# Patient Record
Sex: FEMALE | Race: WHITE | NOT HISPANIC OR LATINO | Employment: FULL TIME | URBAN - METROPOLITAN AREA
[De-identification: names, ages, dates, MRNs, and addresses within clinical notes are randomized per-mention and may not be internally consistent; named-entity substitution may affect disease eponyms.]

---

## 2017-05-03 ENCOUNTER — TRANSCRIBE ORDERS (OUTPATIENT)
Dept: OBSTETRICS AND GYNECOLOGY | Facility: CLINIC | Age: 57
End: 2017-05-03

## 2017-05-03 DIAGNOSIS — Z12.31 VISIT FOR SCREENING MAMMOGRAM: Primary | ICD-10-CM

## 2017-05-05 ENCOUNTER — HOSPITAL ENCOUNTER (OUTPATIENT)
Dept: MAMMOGRAPHY | Facility: HOSPITAL | Age: 57
Discharge: HOME | End: 2017-05-05
Attending: OBSTETRICS & GYNECOLOGY | Admitting: OBSTETRICS & GYNECOLOGY

## 2017-05-05 DIAGNOSIS — Z12.31 VISIT FOR SCREENING MAMMOGRAM: ICD-10-CM

## 2017-05-05 PROCEDURE — 77063 BREAST TOMOSYNTHESIS BI: CPT | Performed by: RADIOLOGY

## 2017-05-05 PROCEDURE — 77067 SCR MAMMO BI INCL CAD: CPT | Performed by: RADIOLOGY

## 2017-05-05 PROCEDURE — G0202 SCR MAMMO BI INCL CAD: HCPCS

## 2017-05-05 PROCEDURE — 77063 BREAST TOMOSYNTHESIS BI: CPT

## 2017-05-08 PROBLEM — Z01.419 WELL WOMAN EXAM: Status: ACTIVE | Noted: 2017-05-08

## 2017-06-20 ENCOUNTER — OFFICE VISIT (OUTPATIENT)
Dept: OBSTETRICS AND GYNECOLOGY | Facility: CLINIC | Age: 57
End: 2017-06-20

## 2017-06-20 ENCOUNTER — APPOINTMENT (OUTPATIENT)
Dept: LAB | Facility: HOSPITAL | Age: 57
End: 2017-06-20

## 2017-06-20 VITALS
HEIGHT: 62 IN | WEIGHT: 178 LBS | DIASTOLIC BLOOD PRESSURE: 82 MMHG | RESPIRATION RATE: 14 BRPM | BODY MASS INDEX: 32.76 KG/M2 | SYSTOLIC BLOOD PRESSURE: 120 MMHG

## 2017-06-20 DIAGNOSIS — Z01.411 ENCOUNTER FOR GYNECOLOGICAL EXAMINATION WITH ABNORMAL FINDING: Primary | ICD-10-CM

## 2017-06-20 DIAGNOSIS — N39.46 MIXED URGE AND STRESS INCONTINENCE: ICD-10-CM

## 2017-06-20 LAB
BACTERIA UR QL AUTO: ABNORMAL /HPF
BILIRUB UR QL STRIP: NEGATIVE
CLARITY UR: CLEAR
COLOR UR: YELLOW
GLUCOSE UR STRIP-MCNC: NEGATIVE MG/DL
HBA1C MFR BLD: 5.8 % (ref 4.8–5.6)
HGB UR QL STRIP.AUTO: ABNORMAL
HYALINE CASTS UR QL AUTO: ABNORMAL /LPF
KETONES UR QL STRIP: NEGATIVE
LEUKOCYTE ESTERASE UR QL STRIP.AUTO: ABNORMAL
NITRITE UR QL STRIP: NEGATIVE
PH UR STRIP.AUTO: 5.5 [PH] (ref 5–8)
PROT UR QL STRIP: NEGATIVE
RBC # UR: ABNORMAL /HPF
REF LAB TEST METHOD: ABNORMAL
SP GR UR STRIP: 1.01 (ref 1–1.03)
SQUAMOUS #/AREA URNS HPF: ABNORMAL /HPF
UROBILINOGEN UR QL STRIP: ABNORMAL
WBC UR QL AUTO: ABNORMAL /HPF

## 2017-06-20 PROCEDURE — 87147 CULTURE TYPE IMMUNOLOGIC: CPT | Performed by: OBSTETRICS & GYNECOLOGY

## 2017-06-20 PROCEDURE — 81001 URINALYSIS AUTO W/SCOPE: CPT | Performed by: OBSTETRICS & GYNECOLOGY

## 2017-06-20 PROCEDURE — 83036 HEMOGLOBIN GLYCOSYLATED A1C: CPT | Performed by: OBSTETRICS & GYNECOLOGY

## 2017-06-20 PROCEDURE — 36415 COLL VENOUS BLD VENIPUNCTURE: CPT | Performed by: OBSTETRICS & GYNECOLOGY

## 2017-06-20 PROCEDURE — 87086 URINE CULTURE/COLONY COUNT: CPT | Performed by: OBSTETRICS & GYNECOLOGY

## 2017-06-20 PROCEDURE — 99396 PREV VISIT EST AGE 40-64: CPT | Performed by: OBSTETRICS & GYNECOLOGY

## 2017-06-20 NOTE — PROGRESS NOTES
Subjective   Chief Complaint   Patient presents with   • Gynecologic Exam     Milana Elias is a 56 y.o. year old  menopausal female presenting to be seen for her annual exam.  This past year she has not been on hormone replacement therapy.  There has not been vaginal bleeding in the last 12 months.  Menopausal symptoms are present (night sweats and urinary frequency) but not affecting her quality of life .    Youngest daughter, Madhuri is getting  in 2017 in [place].    SEXUAL Hx:  She is currently sexually active.  In the past year there has not been new sexual partners.    Condoms are not typically used.  She would not like to be screened for STD's at today's exam.  [place] is painful: occassionally  HEALTH Hx:  She exercises regularly: no (but is planning to start exercising more ).  She wears her seat belt: yes.  She has concerns about domestic violence: no.  She has noticed changes in height: no.  OTHER THINGS SHE WANTS TO DISCUSS TODAY:  Nothing else    The following portions of the patient's history were reviewed and updated as appropriate:problem list, current medications, allergies, past family history, past medical history, past social history and past surgical history.    Smoking status: Never Smoker                                                              Smokeless status: Never Used                          Review of Systems  Constitutional POS: nothing reported    NEG: anorexia or night sweats   Gastointestinal POS: bloating - normal U/S 2015    NEG: change in bowel habits, melena or reflux symptoms   Genitourinary POS: frequency, hesitancy, nocturia and urgency - affecting her activities of daily living.    NEG: dysuria or hematuria   Integument POS: nothing reported    NEG: moles that are changing in size, shape, color or rashes   Breast POS: nothing reported    NEG: persistent breast lump, skin dimpling or nipple discharge        Objective   /82  Resp 14   "Ht 62\" (157.5 cm)  Wt 178 lb (80.7 kg)  LMP  (LMP Unknown)  Breastfeeding? No  BMI 32.56 kg/m2    General:  well developed; well nourished  no acute distress   Skin:  No suspicious lesions seen   Thyroid: normal to inspection and palpation   Breasts:  Examined in supine position  Symmetric without masses or skin dimpling  Nipples normal without inversion, lesions or discharge  There are no palpable axillary nodes   Abdomen: soft, non-tender; no masses  no umbilical or inginual hernias are present  no hepato-splenomegaly   Pelvis: Clinical staff was present for exam  External genitalia:  normal appearance of the external genitalia including Bartholin's and [place]'s glands.  :  urethral meatus normal; urethral hypermobility is absent.  Vaginal:  atrophic mucosal changes are present;  Cervix:  normal appearance.  Uterus:  normal size, shape and consistency.  Adnexa:  non palpable bilaterally.  Rectal:  digital rectal exam not performed; anus visually normal appearing.        Assessment   1. Normal GYN exam  2. Menopausal female currently not on HRT - without significant symptoms affecting activities of daily living  3. ELAINE - affecting her activities of daily living.     Plan   1. Pap was not done today.  I explained to Milana that the recommendations for Pap smear interval in a low risk patient has lengthened to 3 years time.  I told Milana she still needs to be seen in our office yearly for a full physical including breast and pelvic exam.  2. She was encouraged to get yearly mammograms.  She should report any palpable breast lump(s) or skin changes regardless of mammographic findings.  I explained to Milana that notification regarding her mammogram results will come from the center performing the study.  Our office will not be routinely calling with mammogram results.  It is her responsibility to make sure that the results from the mammogram are communicated to her by the breast center.  If she has any questions about " the results, she is welcome to call our office anytime.  3. Up to date on colon cancer screening  4. The following tests were ordered today: HgBA1c and UA with culture if indicated.  It was explained to Milana that all lab test should be back within the one week after they are performed. She will be notified about the results, regardless of the findings. If she has not been contacted by the office within 2 weeks after the test has been performed, it is her responsibility to contact us to learn about her results.  5. Follow up w/u of her bladder concerns w/ urolog            This note was electronically signed.    Reid Raphael M.D.  June 20, 2017

## 2017-06-22 ENCOUNTER — TELEPHONE (OUTPATIENT)
Dept: OBSTETRICS AND GYNECOLOGY | Facility: CLINIC | Age: 57
End: 2017-06-22

## 2017-06-22 LAB
BACTERIA SPEC AEROBE CULT: ABNORMAL
STREP GROUPING: ABNORMAL

## 2017-06-22 NOTE — TELEPHONE ENCOUNTER
----- Message from Peggy Degroot sent at 6/22/2017  3:42 PM EDT -----  Patient is returning your call.

## 2017-09-14 ENCOUNTER — OFFICE VISIT (OUTPATIENT)
Dept: GASTROENTEROLOGY | Facility: CLINIC | Age: 57
End: 2017-09-14

## 2017-09-14 VITALS
SYSTOLIC BLOOD PRESSURE: 110 MMHG | HEART RATE: 70 BPM | TEMPERATURE: 98 F | HEIGHT: 62 IN | DIASTOLIC BLOOD PRESSURE: 80 MMHG | OXYGEN SATURATION: 98 % | RESPIRATION RATE: 14 BRPM | BODY MASS INDEX: 31.65 KG/M2 | WEIGHT: 172 LBS

## 2017-09-14 DIAGNOSIS — R14.0 ABDOMINAL BLOATING: ICD-10-CM

## 2017-09-14 DIAGNOSIS — K58.9 IRRITABLE BOWEL SYNDROME, UNSPECIFIED TYPE: Primary | ICD-10-CM

## 2017-09-14 PROCEDURE — 99214 OFFICE O/P EST MOD 30 MIN: CPT | Performed by: INTERNAL MEDICINE

## 2017-09-14 NOTE — PROGRESS NOTES
[facility]   History & Physical    Chief Complaint   Patient presents with   • Follow-up   • Crohn's Disease         Subjective Here to discuss whether colonoscopy is needed.        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 Diagnosis was made by colonoscopy and biopsies in 2015. Onset was unknown time ago. Disease has involved colon. The patient has had no surgery for treatment of their IBD. The patient is currently having bm's every 3 days and is using Miralax  which are formed and without blood or mucous.  The patient admits to abdominal pain, located in the RLQ. The pain is described as pressure-like, and is 5/10 in intensity.  The patient does not have fever.  The patient does not have weight loss.  The patient does not have extraintestinal symptoms  Although she does have swelling which she thinks may be due to a hernia.  Last colonoscopy was April 2015. She has been off Lialda since Sep 2016.  She has noted more bloating since stopping Lialda.  She is having bm's every 2 days.Her RLQ pain is no longer occurring.  She has toyed with the idea of using a probiotic.             Past Medical History:   Diagnosis Date   • Hyperlipidemia 2012   • IBS (irritable bowel syndrome)    • Subclinical hypothyroidism 2012    Resolved   • Vitamin D deficiency          Family History   Problem Relation Age of Onset   • Brain cancer Mother    • Stroke Mother    • Hypertension Father    • Diabetes Father    • Muscular dystrophy Cousin    • Breast cancer Neg Hx    • Ovarian cancer Neg Hx           reports that she has never smoked. She has never used smokeless tobacco. She reports that she does not drink alcohol or use illicit drugs.        Current Outpatient Prescriptions:   •  Calcium Carbonate (CALTRATE 600 PO), Take  by mouth., Disp: , Rfl:   •  Cholecalciferol (VITAMIN D PO), Take  by mouth., Disp: , Rfl:   •  pravastatin (PRAVACHOL) 20 MG  "tablet, Take 20 mg by mouth daily., Disp: , Rfl:     Allergies:  Levaquin [levofloxacin]    ROS:    Review of Systems   Constitution: Negative.   HENT: Negative.    Eyes: Negative.    Cardiovascular: Negative.    Respiratory: Negative.    Endocrine: Negative.    Hematologic/Lymphatic: Negative.    Skin: Negative.    Musculoskeletal: Negative.    Gastrointestinal: Negative.    Genitourinary: Negative.    Neurological: Negative.    Psychiatric/Behavioral: Negative.    Allergic/Immunologic: Negative.        Objective     Blood pressure 110/80, pulse 70, temperature 98 °F (36.7 °C), temperature source Temporal Artery , resp. rate 14, height 62\" (157.5 cm), weight 172 lb (78 kg), SpO2 98 %, not currently breastfeeding.    Physical Exam   Constitutional: Pt is oriented to person, place, and time and well-developed, well-nourished, and in no distress.   HENT:   Mouth/Throat: Oropharynx is clear and moist.   Neck: Normal range of motion. Neck supple.   Cardiovascular: Normal rate, regular rhythm and normal heart sounds.    Pulmonary/Chest: Effort normal and breath sounds normal. No respiratory distress. No  wheezes.   Abdominal: Soft. Bowel sounds are normal.  Soft, non-tender, normal bowel sounds; no bruits, organomegaly or masses.She does have some distention of abdomen.  Skin: Skin is warm and dry.   Psychiatric: Mood, memory, affect and judgment normal.     Assessment/Plan   She had a colitis that is indeterminate for inflammatory bowel disease vs. Post-antibiotic changes.  So, I think her minor symptoms are really irritable bowel syndrome at present.  I would recommend a repeat colonoscopy in 2020 just to be sure no IBD is present or, if new symptoms develop, I would colonoscope at that juncture.     Diagnosis:  Milana was seen today for follow-up and crohn's disease.    Diagnoses and all orders for this visit:    Irritable bowel syndrome, unspecified type    Abdominal bloating      Anticipated Surgical Procedure:    The " risks, benefits, and alternatives of this procedure have been discussed with the patient or the responsible party- the patient understands and agrees to proceed.

## 2017-12-15 ENCOUNTER — CONSULT (OUTPATIENT)
Dept: CARDIOLOGY | Facility: CLINIC | Age: 57
End: 2017-12-15

## 2017-12-15 VITALS
SYSTOLIC BLOOD PRESSURE: 110 MMHG | BODY MASS INDEX: 32.24 KG/M2 | WEIGHT: 175.2 LBS | DIASTOLIC BLOOD PRESSURE: 86 MMHG | HEART RATE: 76 BPM | HEIGHT: 62 IN

## 2017-12-15 DIAGNOSIS — R06.09 DYSPNEA ON EXERTION: ICD-10-CM

## 2017-12-15 DIAGNOSIS — R07.2 PRECORDIAL PAIN: Primary | ICD-10-CM

## 2017-12-15 DIAGNOSIS — I49.3 PVC (PREMATURE VENTRICULAR CONTRACTION): ICD-10-CM

## 2017-12-15 DIAGNOSIS — E78.2 MIXED HYPERLIPIDEMIA: ICD-10-CM

## 2017-12-15 DIAGNOSIS — R00.2 PALPITATIONS: ICD-10-CM

## 2017-12-15 PROCEDURE — 99244 OFF/OP CNSLTJ NEW/EST MOD 40: CPT | Performed by: INTERNAL MEDICINE

## 2017-12-15 RX ORDER — ASCORBIC ACID
CRYSTALS ORAL DAILY
COMMUNITY

## 2017-12-15 NOTE — PROGRESS NOTES
[facility] at [facility]  Consultation H&P  Milana Elias  1960      VISIT DATE:  12/15/2017    PCP: Jf Jacobsen MD  [address] [address]    IDENTIFICATION: A 57 y.o. female from [facility]. 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 Palpitations  1. 2017 Holter: Average HR 69, range , 9 PVCs, 22 PACs with 3 supraventricular couplets, no runs, no pauses  2. HLD, on pravastatin  3. Elevated glucose  1. 17 A1c 5.8  4. Vit. D deficiency  5. Hypothyroidism  6. Osteopenia  7. IBS  8. Mixed urge/stress incontinence  9. Allergic rhinitis  10. , nl pregnancies   11. Surgical Hx:  1. Cholecystectomy  2. [place] teeth extraciton    Allergies  Allergies   Allergen Reactions   • Levaquin [Levofloxacin]        Current Medications    Current Outpatient Prescriptions:   •  Calcium Carbonate (CALTRATE 600 PO), Take  by mouth., Disp: , Rfl:   •  Cholecalciferol (VITAMIN D PO), Take  by mouth., Disp: , Rfl:   •  Cyanocobalamin (VITAMIN B 12) 250 MCG lozenge, Take  by mouth., Disp: , Rfl:   •  pravastatin (PRAVACHOL) 20 MG tablet, Take 20 mg by mouth daily., Disp: , Rfl:      History of Present Illness   HPI  This is a 57-year-old  female with the above mentioned PMH who presents for consult from PCP Dr. Jacobsen for evaluation of palpitations.  She notices this most often at nighttime.  PCP started her on metoprolol succinate 25 and ordered a Holter monitor which showed a very low burden of ectopy. She states she was not very symptomatic when she wore it.     Patient also reports experiencing pain in some dyspnea on exertion. She first noticed about 6 months ago she walked up 4 flights of stairs and was very dyspneic, and it took her a while to recover. Since then, she's noticed  tachycardia and dyspnea with less exertion that that has been unusual for her. Sometimes she has chest tightness and nausea along with this as well. Additionally, she has noticed at the same time she can have paresthesias in the tips of her fingers and toes bilaterally. These symptoms are occurring more often, several times per week. Overall she reports feeling poorly.     Pt has had no prior cardiac testing. She reports she has a poor diet, eats fast food and sweets. She gets no formal exercise. Lots of stress, her mother lives alone and has dementia and the pt coordinates care for her.     Pt denies any dyspnea at rest, orthopnea, PND, lower extremity edema, or claudication. Pt denies history of CHF, DVT, PE, MI, CVA, TIA, or rheumatic fever. She has family hx of several family members dying of MI, most in older age, but her grandmother was in her early 50s.        ROS  Review of Systems   Constitution: Positive for malaise/fatigue.   HENT: Positive for hearing loss and tinnitus.    Cardiovascular: Positive for chest pain and dyspnea on exertion.   Respiratory: Positive for shortness of breath and snoring.    Endocrine: Positive for cold intolerance.   Gastrointestinal: Positive for nausea.   Genitourinary: Positive for bladder incontinence and frequency.   Neurological: Positive for headaches.   All other systems reviewed and are negative.      SOCIAL HX  Social History     Social History   • Marital status:      Spouse name: N/A   • Number of children: N/A   • Years of education: N/A     Occupational History   • Not on file.     Social History Main Topics   • Smoking status: Never Smoker   • Smokeless tobacco: Never Used   • Alcohol use No   • Drug use: No   • Sexual activity: Defer     Other Topics Concern   • Not on file     Social History Narrative       FAMILY HX  Family History   Problem Relation Age of Onset   • Brain cancer Mother    • Stroke Mother    • Dementia Mother    • Hypertension Father   "  • Diabetes Father    • Heart attack Father    • Muscular dystrophy Cousin    • Breast cancer Neg Hx    • Ovarian cancer Neg Hx        Vitals:    12/15/17 1343   BP: 110/86   BP Location: Left arm   Patient Position: Sitting   Pulse: 76   Weight: 79.5 kg (175 lb 3.2 oz)   Height: 157.5 cm (62\")       PHYSICAL EXAMINATION:  Physical Exam   Constitutional: She is oriented to person, place, and time. She appears well-developed and well-nourished. No distress.   obese   HENT:   Head: Normocephalic and atraumatic.   Right Ear: External ear normal.   Left Ear: External ear normal.   Nose: Nose normal.   Eyes: Conjunctivae and EOM are normal.   Neck: Neck supple. No hepatojugular reflux and no JVD present. Carotid bruit is not present. No thyromegaly present.   Cardiovascular: Normal rate, regular rhythm, S1 normal, S2 normal, normal heart sounds, intact distal pulses and normal pulses.   Occasional extrasystoles are present. Exam reveals no gallop, no distant heart sounds and no midsystolic click.    No murmur heard.  Pulses:       Radial pulses are 2+ on the right side, and 2+ on the left side.        Dorsalis pedis pulses are 2+ on the right side, and 2+ on the left side.        Posterior tibial pulses are 2+ on the right side, and 2+ on the left side.   Pulmonary/Chest: Effort normal and breath sounds normal. No respiratory distress. She has no decreased breath sounds. She has no wheezes. She has no rhonchi. She has no rales.   Abdominal: Soft. Bowel sounds are normal. There is no hepatosplenomegaly. There is no tenderness.   Musculoskeletal: Normal range of motion. She exhibits no edema.   Neurological: She is alert and oriented to person, place, and time.   No focal deficits.   Skin: Skin is warm and dry. No erythema.   Psychiatric: She has a normal mood and affect. Thought content normal.   Nursing note and vitals reviewed.      Diagnostic Data:  Procedures    Lab Results   Component Value Date    GLUCOSE 86 " 05/28/2015    BUN 10 05/28/2015    CREATININE 0.80 09/27/2016     05/28/2015    K 4.1 05/28/2015     05/28/2015    CO2 28 05/28/2015     Lab Results   Component Value Date    HGBA1C 5.80 (H) 06/20/2017     Lab Results   Component Value Date    WBC 6.02 05/28/2015    HGB 13.6 05/28/2015    HCT 41.0 05/28/2015     05/28/2015       ASSESSMENT:   Diagnosis Plan   1. Precordial pain  Adult Stress Echo W/ Cont or Stress Agent if Necessary Per Protocol   2. Mixed hyperlipidemia     3. Palpitations  Adult Stress Echo W/ Cont or Stress Agent if Necessary Per Protocol   4. PVC (premature ventricular contraction)  Adult Stress Echo W/ Cont or Stress Agent if Necessary Per Protocol   5. Dyspnea on exertion  Adult Stress Echo W/ Cont or Stress Agent if Necessary Per Protocol       PLAN:  1. Exertional chest discomfort in a 57-year-old female with family history and hyperlipidemia and no prior ischemic testing, we will risk stratify with noninvasive ischemic evaluation.  2. Lipid status unknown, followed per PCP.  Patient to continue statin therapy with pravastatin. We will obtain recent labs from PCP.   3. Low ectopy burden on Holter, however patient was not very symptomatic during the study.  Patient given metoprolol by PCP but has not been taking it due to wating our opinion.  We instructed her on pill in a pocket use.   We will evaluate structure and function of heart and see if there is increased burden of ectopy with exercise with stress echocardiogram.  4. Patient counseled on the importance of risk factor modification with lifestyle changes such as altering her diet and getting regular exercise.  Patient cautioned to limit fast food intake, and also watch carb intake by limiting bread, pasta, potatoes, sweets.    RTC 6 months, sooner if needed    Scribed for Nathaniel Colon MD by Marielena Cornell PA-C. 12/15/2017  2:27 PM   I, Nathaniel Colon MD, personally performed the services described in this  documentation as scribed by the above named individual in my presence, and it is both accurate and complete.  12/15/2017  2:52 PM    Nathaniel Colon MD, FACC

## 2018-01-11 ENCOUNTER — HOSPITAL ENCOUNTER (OUTPATIENT)
Dept: CARDIOLOGY | Facility: HOSPITAL | Age: 58
Discharge: HOME | End: 2018-01-11
Admitting: PHYSICIAN ASSISTANT

## 2018-01-11 VITALS — HEIGHT: 62 IN | WEIGHT: 171 LBS | BODY MASS INDEX: 31.47 KG/M2

## 2018-01-11 DIAGNOSIS — R00.2 PALPITATIONS: ICD-10-CM

## 2018-01-11 DIAGNOSIS — R06.09 DYSPNEA ON EXERTION: ICD-10-CM

## 2018-01-11 DIAGNOSIS — R07.2 PRECORDIAL PAIN: ICD-10-CM

## 2018-01-11 DIAGNOSIS — I49.3 PVC (PREMATURE VENTRICULAR CONTRACTION): ICD-10-CM

## 2018-01-11 PROCEDURE — 93017 CV STRESS TEST TRACING ONLY: CPT

## 2018-01-11 PROCEDURE — 25010000002 SULFUR HEXAFLUORIDE MICROSPH 60.7-25 MG RECONSTITUTED SUSPENSION: Performed by: INTERNAL MEDICINE

## 2018-01-11 PROCEDURE — 93350 STRESS TTE ONLY: CPT | Performed by: INTERNAL MEDICINE

## 2018-01-11 PROCEDURE — 93325 DOPPLER ECHO COLOR FLOW MAPG: CPT

## 2018-01-11 PROCEDURE — 93320 DOPPLER ECHO COMPLETE: CPT

## 2018-01-11 PROCEDURE — 93350 STRESS TTE ONLY: CPT

## 2018-01-11 PROCEDURE — 93018 CV STRESS TEST I&R ONLY: CPT | Performed by: INTERNAL MEDICINE

## 2018-01-11 RX ADMIN — SULFUR HEXAFLUORIDE 2 ML: KIT at 08:15

## 2018-01-16 LAB
BH CV ECHO MEAS - BSA(HAYCOCK): 1.9 M^2
BH CV ECHO MEAS - BSA: 1.8 M^2
BH CV ECHO MEAS - BZI_BMI: 31.3 KILOGRAMS/M^2
BH CV ECHO MEAS - BZI_METRIC_HEIGHT: 157.5 CM
BH CV ECHO MEAS - BZI_METRIC_WEIGHT: 77.6 KG
BH CV STRESS BP STAGE 1: NORMAL
BH CV STRESS BP STAGE 2: NORMAL
BH CV STRESS BP STAGE 3: NORMAL
BH CV STRESS DURATION MIN STAGE 1: 3
BH CV STRESS DURATION MIN STAGE 2: 3
BH CV STRESS DURATION SEC STAGE 1: 0
BH CV STRESS DURATION SEC STAGE 2: 0
BH CV STRESS DURATION SEC STAGE 3: 47
BH CV STRESS GRADE STAGE 1: 10
BH CV STRESS GRADE STAGE 2: 12
BH CV STRESS GRADE STAGE 3: 14
BH CV STRESS HR STAGE 1: 129
BH CV STRESS HR STAGE 2: 148
BH CV STRESS HR STAGE 3: 160
BH CV STRESS METS STAGE 1: 5
BH CV STRESS METS STAGE 2: 7.5
BH CV STRESS METS STAGE 3: 10
BH CV STRESS PROTOCOL 1: NORMAL
BH CV STRESS RECOVERY BP: NORMAL MMHG
BH CV STRESS RECOVERY HR: 99 BPM
BH CV STRESS SPEED STAGE 1: 1.7
BH CV STRESS SPEED STAGE 2: 2.5
BH CV STRESS SPEED STAGE 3: 3.4
BH CV STRESS STAGE 1: 1
BH CV STRESS STAGE 2: 2
BH CV STRESS STAGE 3: 3
MAXIMAL PREDICTED HEART RATE: 165 BPM
PERCENT MAX PREDICTED HR: 96.97 %
STRESS BASELINE BP: NORMAL MMHG
STRESS BASELINE HR: 64 BPM
STRESS PERCENT HR: 114 %
STRESS POST ESTIMATED WORKLOAD: 9.3 METS
STRESS POST EXERCISE DUR MIN: 6 MIN
STRESS POST EXERCISE DUR SEC: 47 SEC
STRESS POST PEAK BP: NORMAL MMHG
STRESS POST PEAK HR: 160 BPM
STRESS TARGET HR: 140 BPM

## 2018-02-13 ENCOUNTER — TELEPHONE (OUTPATIENT)
Dept: CARDIOLOGY | Facility: CLINIC | Age: 58
End: 2018-02-13

## 2018-02-13 NOTE — TELEPHONE ENCOUNTER
Called patient back and left Vm that I do not see where we had aspirin listed in her med list or where this was started. Please call with further questions.

## 2018-06-29 ENCOUNTER — OFFICE VISIT (OUTPATIENT)
Dept: CARDIOLOGY | Facility: CLINIC | Age: 58
End: 2018-06-29

## 2018-06-29 VITALS
BODY MASS INDEX: 32.72 KG/M2 | DIASTOLIC BLOOD PRESSURE: 66 MMHG | HEIGHT: 62 IN | SYSTOLIC BLOOD PRESSURE: 110 MMHG | WEIGHT: 177.8 LBS | HEART RATE: 91 BPM

## 2018-06-29 DIAGNOSIS — E78.2 MIXED HYPERLIPIDEMIA: ICD-10-CM

## 2018-06-29 DIAGNOSIS — R00.2 PALPITATIONS: Primary | ICD-10-CM

## 2018-06-29 PROCEDURE — 99213 OFFICE O/P EST LOW 20 MIN: CPT | Performed by: INTERNAL MEDICINE

## 2018-06-29 NOTE — PROGRESS NOTES
[facility] at [facility]  Consultation H&P  Milana Elias  1960      VISIT DATE:  2018      PCP: Jf Jacobsen MD  [address] [address]    IDENTIFICATION: A 57 y.o. female from [facility]. 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 Palpitations  1. 2017 Holter: Average HR 69, range , 9 PVCs, 22 PACs with 3 supraventricular couplets, no runs, no pauses  2. HLD, on pravastatin  3. CP   SE wnl freq PVC  4. Elevated glucose  1. 17 A1c 5.8  5. Vit. D deficiency  6. Hypothyroidism  7. Osteopenia  8. IBS  9. Mixed urge/stress incontinence  10. Allergic rhinitis  11. , nl pregnancies   12. Surgical Hx:  1. Cholecystectomy  2. [place] teeth extraciton    Allergies  Allergies   Allergen Reactions   • Levaquin [Levofloxacin] Other (See Comments)     Dry heaves, Tachycardia, Fever       Current Medications    Current Outpatient Prescriptions:   •  Calcium Carbonate (CALTRATE 600 PO), Take  by mouth Daily., Disp: , Rfl:   •  Cholecalciferol (VITAMIN D PO), Take  by mouth Daily., Disp: , Rfl:   •  Cyanocobalamin (VITAMIN B 12) 250 MCG lozenge, Take  by mouth Daily., Disp: , Rfl:   •  pravastatin (PRAVACHOL) 20 MG tablet, Take 20 mg by mouth daily., Disp: , Rfl:      History of Present Illness   Hyperlipidemia     Palpitations        This is a 57-year-old  female with the above mentioned PMH who presents for Follow-up.  She states that she's recently transitioned from work and is feeling much better.  She does no regular aerobic activity that she can state at current    ROS  Review of Systems   Cardiovascular: Positive for palpitations.       SOCIAL HX  Social History     Social History   • Marital status:      Spouse name: N/A   • Number of children: N/A   • Years of education: N/A  "    Occupational History   • Not on file.     Social History Main Topics   • Smoking status: Never Smoker   • Smokeless tobacco: Never Used   • Alcohol use No   • Drug use: No   • Sexual activity: Defer     Other Topics Concern   • Not on file     Social History Narrative   • No narrative on file       FAMILY HX  Family History   Problem Relation Age of Onset   • Brain cancer Mother    • Stroke Mother    • Dementia Mother    • Hypertension Father    • Diabetes Father    • Heart attack Father    • Muscular dystrophy Cousin    • Breast cancer Neg Hx    • Ovarian cancer Neg Hx        Vitals:    06/29/18 1442   BP: 110/66   BP Location: Left arm   Patient Position: Sitting   Pulse: 91   Weight: 80.6 kg (177 lb 12.8 oz)   Height: 157.5 cm (62\")       PHYSICAL EXAMINATION:  Physical Exam   Constitutional: She is oriented to person, place, and time. She appears well-developed and well-nourished. No distress.   obese   HENT:   Head: Normocephalic and atraumatic.   Right Ear: External ear normal.   Left Ear: External ear normal.   Nose: Nose normal.   Eyes: Conjunctivae and EOM are normal.   Neck: Neck supple. No hepatojugular reflux and no JVD present. Carotid bruit is not present. No thyromegaly present.   Cardiovascular: Normal rate, regular rhythm, S1 normal, S2 normal, normal heart sounds, intact distal pulses and normal pulses.   Occasional extrasystoles are present. Exam reveals no gallop, no distant heart sounds and no midsystolic click.    No murmur heard.  Pulses:       Radial pulses are 2+ on the right side, and 2+ on the left side.        Dorsalis pedis pulses are 2+ on the right side, and 2+ on the left side.        Posterior tibial pulses are 2+ on the right side, and 2+ on the left side.   Pulmonary/Chest: Effort normal and breath sounds normal. No respiratory distress. She has no decreased breath sounds. She has no wheezes. She has no rhonchi. She has no rales.   Abdominal: Soft. Bowel sounds are normal. " There is no hepatosplenomegaly. There is no tenderness.   Musculoskeletal: Normal range of motion. She exhibits no edema.   Neurological: She is alert and oriented to person, place, and time.   No focal deficits.   Skin: Skin is warm and dry. No erythema.   Psychiatric: She has a normal mood and affect. Thought content normal.   Nursing note and vitals reviewed.      Diagnostic Data:  Procedures    Lab Results   Component Value Date    GLUCOSE 86 05/28/2015    BUN 10 05/28/2015    CREATININE 0.80 09/27/2016     05/28/2015    K 4.1 05/28/2015     05/28/2015    CO2 28 05/28/2015     Lab Results   Component Value Date    HGBA1C 5.80 (H) 06/20/2017     Lab Results   Component Value Date    WBC 6.02 05/28/2015    HGB 13.6 05/28/2015    HCT 41.0 05/28/2015     05/28/2015       ASSESSMENT:   Diagnosis Plan   1. Palpitations     2. Mixed hyperlipidemia         PLAN:  Dyslipidemia on statin therapy    PVCs now improved with less work-related stress    Follow-up when necessary    I, Nathaniel Colon MD, personally performed the services described in this documentation as scribed by the above named individual in my presence, and it is both accurate and complete.  6/29/2018  3:04 PM    Nathaniel Colon MD, [facility]

## 2019-05-02 ENCOUNTER — OFFICE VISIT (OUTPATIENT)
Dept: OBSTETRICS AND GYNECOLOGY | Facility: CLINIC | Age: 59
End: 2019-05-02

## 2019-05-02 VITALS
SYSTOLIC BLOOD PRESSURE: 110 MMHG | BODY MASS INDEX: 32.17 KG/M2 | HEIGHT: 62 IN | WEIGHT: 174.8 LBS | DIASTOLIC BLOOD PRESSURE: 78 MMHG

## 2019-05-02 DIAGNOSIS — N39.46 MIXED URGE AND STRESS INCONTINENCE: ICD-10-CM

## 2019-05-02 DIAGNOSIS — N95.2 VAGINAL ATROPHY: ICD-10-CM

## 2019-05-02 DIAGNOSIS — Z01.419 ENCOUNTER FOR GYNECOLOGICAL EXAMINATION WITHOUT ABNORMAL FINDING: ICD-10-CM

## 2019-05-02 DIAGNOSIS — Z78.0 MENOPAUSE: Primary | ICD-10-CM

## 2019-05-02 PROCEDURE — 99396 PREV VISIT EST AGE 40-64: CPT | Performed by: OBSTETRICS & GYNECOLOGY

## 2019-05-02 NOTE — PROGRESS NOTES
Chief Complaint   Patient presents with   • Gynecologic Exam       Milana Elias is a 58 y.o. year old  presenting to be seen for her annual exam.  This patient is seeing me for her first gynecologic wellness visit.  She is a former patient of Dr. Sandoval Rojas.  She is menopausal and has complaints of vaginal dryness and dyspareunia.  She denies other menopausal symptoms.  She has intermittent stress incontinence with coughing, straining, sneezing, and lifting.  She is in physical therapy for pelvic floor strengthening.  She denies bowel symptoms.    SCREENING TESTS    Year 2012 2016 2017 2018   Age                         PAP    Neg.                     HPV high risk                         Mammogram      benign                   ARY score                         Breast MRI                         Lipids                         Vitamin D                         Colonoscopy                         DEXA  Frax (hip/any)                         Ovarian Screen                             She exercises regularly: yes.  She wears her seat belt: yes.  She has concerns about domestic violence: no.  She has noticed changes in height: no    GYN screening history:  · Last mammogram: was done on approximately 2017 and the result was: Birads I (Normal)..    No Additional Complaints Reported    The following portions of the patient's history were reviewed and updated as appropriate:vital signs and   She  has a past medical history of Hyperlipidemia (), IBS (irritable bowel syndrome), Subclinical hypothyroidism (), and Vitamin D deficiency.  She does not have any pertinent problems on file.  She  has a past surgical history that includes Colonoscopy (2015); Endometrial biopsy (2012); Laparoscopic cholecystectomy (); and [place] tooth extraction ().  Her family history includes Brain cancer in her mother;  "Dementia in her mother; Diabetes in her father; Heart attack in her father; Hypertension in her father; Muscular dystrophy in her cousin; Stroke in her mother.  She  reports that she has never smoked. She has never used smokeless tobacco. She reports that she does not drink alcohol or use drugs.  Current Outpatient Medications   Medication Sig Dispense Refill   • Calcium Carbonate (CALTRATE 600 PO) Take  by mouth Daily.     • Cholecalciferol (VITAMIN D PO) Take  by mouth Daily.     • Cyanocobalamin (VITAMIN B 12) 250 MCG lozenge Take  by mouth Daily.     • Ospemifene 60 MG tablet Take 60 mg by mouth Daily. 90 tablet 3   • pravastatin (PRAVACHOL) 20 MG tablet Take 20 mg by mouth daily.       No current facility-administered medications for this visit.      She is allergic to levaquin [levofloxacin]..    Review of Systems  A comprehensive review of systems was taken.  Constitutional: negative for fever, chills, activity change, appetite change, fatigue and unexpected weight change.  Respiratory: negative  Cardiovascular: negative  Gastrointestinal: negative  Genitourinary:positive for urinary incontinence  Musculoskeletal:negative  Behavioral/Psych: negative       /78   Ht 157.5 cm (62\")   Wt 79.3 kg (174 lb 12.8 oz)   LMP  (LMP Unknown)   Breastfeeding? No   BMI 31.97 kg/m²     Physical Exam    General:  alert; cooperative; well developed; well nourished   Skin:  No suspicious lesions seen   Thyroid: normal to inspection and palpation   Lungs:  clear to auscultation bilaterally   Heart:  regular rate and rhythm, S1, S2 normal, no murmur, click, rub or gallop   Breasts:  Examined in supine position  Symmetric without masses or skin dimpling  Nipples normal without inversion, lesions or discharge  There are no palpable axillary nodes   Abdomen: soft, non-tender; no masses  no umbilical or inguinal hernias are present  no hepato-splenomegaly   Pelvis: Clinical staff was present for exam  External genitalia:  " normal appearance of the external genitalia including Bartholin's and [place]'s glands.  Vaginal:  atrophic mucosal changes are present; pH = 6.0  Cervix:  normal appearance.  Uterus:  normal size, shape and consistency. anteverted;  Adnexa:  non palpable bilaterally.  Rectal:  anus visually normal appearing. recto-vaginal exam unremarkable and confirms findings;     Lab Review   No data reviewed    Imaging  Mammogram results         ASSESSMENT  Problems Addressed this Visit        Genitourinary    Mixed urge and stress incontinence    Menopause - Primary    Vaginal atrophy    Relevant Medications    Ospemifene 60 MG tablet      Other Visit Diagnoses     Encounter for gynecological examination without abnormal finding        Relevant Orders    Liquid-based Pap Smear, Screening          PLAN    Medications prescribed this encounter:    New Medications Ordered This Visit   Medications   • Ospemifene 60 MG tablet     Sig: Take 60 mg by mouth Daily.     Dispense:  90 tablet     Refill:  3   · Monthly self breast assessment, breast imaging must be done  · I have encouraged exercise and a low carbohydrate diet for weight control  · I have reviewed with the patient options of therapy for vaginal atrophy including estrogen vaginal cream; estrogen vaginal tablets; intra-Nicole suppositories; and ospemifene oral tablets.  She desires the latter therapy.  I have explained to her the risks and benefits of the estrogen receptor modulator.  · I have discussed urinary incontinence with the patient and reviewed anatomy of the bladder and urethra.  I have explained to the patient that she has no evidence of a cystocele.  I have encouraged her to continue Kegel's exercises and pelvic floor strengthening.  I have explained to her that if she does not gain control of incontinence surgery would be indicated.  · Calcium, 600 mg/ Vit. D, 400 IU daily; regular weight-bearing exercise  · Follow up: 6 month(s)  *Please note that portions of this  documentation may have been completed with a voice recognition program.  Efforts were made to edit this dictation, but occasional words may have been mistranscribed.       This note was electronically signed.    FOUZIA Munoz MD  May 2, 2019  9:19 AM

## 2019-05-03 ENCOUNTER — TRANSCRIBE ORDERS (OUTPATIENT)
Dept: ADMINISTRATIVE | Facility: HOSPITAL | Age: 59
End: 2019-05-03

## 2019-05-03 DIAGNOSIS — Z12.31 VISIT FOR SCREENING MAMMOGRAM: Primary | ICD-10-CM

## 2019-06-12 ENCOUNTER — HOSPITAL ENCOUNTER (OUTPATIENT)
Dept: MAMMOGRAPHY | Facility: HOSPITAL | Age: 59
Discharge: HOME | End: 2019-06-12
Admitting: OBSTETRICS & GYNECOLOGY

## 2019-06-12 DIAGNOSIS — Z12.31 VISIT FOR SCREENING MAMMOGRAM: ICD-10-CM

## 2019-06-12 PROCEDURE — 77063 BREAST TOMOSYNTHESIS BI: CPT | Performed by: RADIOLOGY

## 2019-06-12 PROCEDURE — 77067 SCR MAMMO BI INCL CAD: CPT | Performed by: RADIOLOGY

## 2019-06-12 PROCEDURE — 77063 BREAST TOMOSYNTHESIS BI: CPT

## 2019-06-12 PROCEDURE — 77067 SCR MAMMO BI INCL CAD: CPT

## 2019-11-19 ENCOUNTER — OFFICE VISIT (OUTPATIENT)
Dept: OBSTETRICS AND GYNECOLOGY | Facility: CLINIC | Age: 59
End: 2019-11-19

## 2019-11-19 VITALS
BODY MASS INDEX: 33.16 KG/M2 | HEIGHT: 62 IN | SYSTOLIC BLOOD PRESSURE: 110 MMHG | DIASTOLIC BLOOD PRESSURE: 74 MMHG | WEIGHT: 180.2 LBS

## 2019-11-19 DIAGNOSIS — N95.2 VAGINAL ATROPHY: Primary | ICD-10-CM

## 2019-11-19 DIAGNOSIS — Z78.0 MENOPAUSE: ICD-10-CM

## 2019-11-19 PROCEDURE — 99213 OFFICE O/P EST LOW 20 MIN: CPT | Performed by: OBSTETRICS & GYNECOLOGY

## 2019-11-19 NOTE — PROGRESS NOTES
"   Chief Complaint   Patient presents with   • Follow-up     6 month check/Osphena.  patient has discontinued.       Milana Elias is a 59 y.o. year old  presenting to be seen because of follow-up for a pause with vaginal atrophy.  6 months ago the patient was started on Osphena, 60 mg by mouth daily.  She developed hot flashes and night sweats as well as some breast tenderness and discontinued the medication.  She continues to have symptoms of vaginal dryness and irritation.  She desires alternative therapy.  She has had no gynecologic surgery.  She has had a laparoscopic cholecystectomy.  She denies bowel or urinary symptoms.    Social History     Substance and Sexual Activity   Sexual Activity Defer   ,          SCREENING TESTS    Year 2012   Age                         PAP        Neg.                 HPV high risk                         Mammogram       benign benign                 ARY score                         Breast MRI                         Lipids                         Vitamin D                         Colonoscopy     \"polyps\"                    DEXA  Frax (hip/any)                         Ovarian Screen                             No Additional Complaints Reported    The following portions of the patient's history were reviewed and updated as appropriate:vital signs and   She  has a past medical history of Hyperlipidemia (), IBS (irritable bowel syndrome), PVD (posterior vitreous detachment), right eye, Subclinical hypothyroidism (), and Vitamin D deficiency.  She does not have any pertinent problems on file.  She  has a past surgical history that includes Colonoscopy (2015); Endometrial biopsy (2012); Laparoscopic cholecystectomy (); and [place] tooth extraction ().  Current Outpatient Medications   Medication Sig Dispense Refill   • Calcium Carbonate (CALTRATE " "600 PO) Take  by mouth Daily.     • Cholecalciferol (VITAMIN D3) 125 MCG (5000 UT) capsule capsule Take 1 capsule by mouth Daily.     • conjugated estrogens (PREMARIN) 0.625 MG/GM vaginal cream Insert 0.5 grams intravaginally, twice a week 30 g 2   • Cyanocobalamin (VITAMIN B 12) 250 MCG lozenge Take  by mouth Daily.     • pravastatin (PRAVACHOL) 20 MG tablet Take 20 mg by mouth daily.       No current facility-administered medications for this visit.      She is allergic to levaquin [levofloxacin]..        Review of Systems  A comprehensive review of systems was not done.  Constitutional: negative for fever, chills, activity change, appetite change, fatigue and unexpected weight change.  Respiratory: not done  Cardiovascular: negative  Gastrointestinal: negative  Genitourinary:positive for vaginal dryness  Musculoskeletal:not done  Behavioral/Psych: negative          /74   Ht 157.5 cm (62\")   Wt 81.7 kg (180 lb 3.2 oz)   Breastfeeding? No   BMI 32.96 kg/m²     Physical Exam    General:  alert; cooperative; well developed; well nourished   Skin:  No suspicious lesions seen   Thyroid: normal to inspection and palpation   Lungs:  clear to auscultation bilaterally   Heart:  regular rate and rhythm, S1, S2 normal, no murmur, click, rub or gallop   Breasts:  Not performed.   Abdomen: soft, non-tender; no masses  no umbilical or inguinal hernias are present  no hepato-splenomegaly   Pelvis: Not performed.     Lab Review   No data reviewed    Imaging   Mammogram results    Medical counseling was given to patient for the following topics: diagnostic results, instructions for management, risk factor reductions, impressions, risks and benefits of treatment options and importance of treatment compliance . Total time of the encounter was 15 minute(s) and 12 minute(s)  was spent in face to face counseling.  I have discussed the side effects that the patient had on Osphena with her.  I have counseled her that she should " not take this medication.  I have discussed options of therapy including estradiol vaginal tablets, estradiol vaginal cream, and [place] vaginal suppositories.  The patient would like to try an estrogen vaginal cream.  I have recommended Premarin cream to be inserted a 0.5 g twice a week.  I have counseled the patient to use this on a regular basis.  I have advised her to contact me should she develop any side effects.  I have advised her that this is a very low dose of estrogen.          ASSESSMENT  Problems Addressed this Visit        Genitourinary    Menopause    Vaginal atrophy - Primary    Relevant Medications    conjugated estrogens (PREMARIN) 0.625 MG/GM vaginal cream           Substance History:    reports that she has never smoked. She has never used smokeless tobacco.    reports that she does not drink alcohol.    reports that she does not use drugs.    Substance use counseling is not indicated based on patient history.      PLAN    Medications prescribed this encounter:    New Medications Ordered This Visit   Medications   • conjugated estrogens (PREMARIN) 0.625 MG/GM vaginal cream     Sig: Insert 0.5 grams intravaginally, twice a week     Dispense:  30 g     Refill:  2   · Monthly self breast assessment and follow-up breast imaging  · Follow up: 6 month(s)  · Calcium, 600 mg/ Vit. D, 400 IU daily     *Please note that portions of this documentation may have been completed with a voice recognition program.  Efforts were made to edit this dictation, but occasional words may have been mistranscribed.     This note was electronically signed.    FOUZIA Munoz MD  November 19, 2019  4:34 PM

## 2020-01-28 DIAGNOSIS — Z12.11 SCREENING FOR COLON CANCER: Primary | ICD-10-CM

## 2020-02-03 ENCOUNTER — OUTSIDE FACILITY SERVICE (OUTPATIENT)
Dept: GASTROENTEROLOGY | Facility: CLINIC | Age: 60
End: 2020-02-03

## 2020-02-03 PROCEDURE — G0105 COLORECTAL SCRN; HI RISK IND: HCPCS | Performed by: INTERNAL MEDICINE

## 2020-08-25 ENCOUNTER — OFFICE VISIT (OUTPATIENT)
Dept: OBSTETRICS AND GYNECOLOGY | Facility: CLINIC | Age: 60
End: 2020-08-25

## 2020-08-25 VITALS
BODY MASS INDEX: 33.42 KG/M2 | WEIGHT: 181.6 LBS | TEMPERATURE: 98.6 F | HEIGHT: 62 IN | DIASTOLIC BLOOD PRESSURE: 78 MMHG | SYSTOLIC BLOOD PRESSURE: 110 MMHG

## 2020-08-25 DIAGNOSIS — N95.2 VAGINAL ATROPHY: ICD-10-CM

## 2020-08-25 DIAGNOSIS — Z01.419 ENCOUNTER FOR GYNECOLOGICAL EXAMINATION WITHOUT ABNORMAL FINDING: ICD-10-CM

## 2020-08-25 DIAGNOSIS — Z78.0 MENOPAUSE: Primary | ICD-10-CM

## 2020-08-25 PROCEDURE — 99396 PREV VISIT EST AGE 40-64: CPT | Performed by: OBSTETRICS & GYNECOLOGY

## 2020-08-25 RX ORDER — VIT A/VIT C/VIT E/ZINC/COPPER 4296-226
1 CAPSULE ORAL DAILY
COMMUNITY

## 2020-08-25 RX ORDER — OCTISALATE, AVOBENZONE, HOMOSALATE, AND OCTOCRYLENE 29.4; 29.4; 49; 25.48 MG/ML; MG/ML; MG/ML; MG/ML
1 LOTION TOPICAL DAILY
COMMUNITY
End: 2022-08-30

## 2020-08-25 NOTE — PROGRESS NOTES
Chief Complaint   Patient presents with   • Annual Exam       Milana Elias is a 59 y.o. year old  presenting to be seen for her annual exam.  This patient is menopausal and does not use estrogen/progestin replacement therapy.  She has no postmenopausal bleeding.  She was treated for vaginal atrophy with Premarin vaginal cream but discontinued that product.  She denies symptoms at present.  She has previously had a laparoscopic cholecystectomy.  She complains of some abdominal bloating which appears to be intermittent.  She has chronic constipation.  She denies urinary symptoms.    SCREENING TESTS    Year 2012   Age                         PAP        Neg.                 HPV high risk                         Mammogram        benign                 ARY score                         Breast MRI                         Lipids                         Vitamin D                         Colonoscopy                         DEXA  Frax (hip/any)                         Ovarian Screen                             She exercises regularly: no.  She wears her seat belt: yes.  She has concerns about domestic violence: no  She has noticed changes in height: no    GYN screening history:  · Last pap: was done on approximately 2019 and the result was: normal PAP.  · Last mammogram: was done on approximately 2019 and the result was: Birads I (Normal)..    No Additional Complaints Reported    The following portions of the patient's history were reviewed and updated as appropriate:vital signs and   She  has a past medical history of Hyperlipidemia (), IBS (irritable bowel syndrome), PVD (posterior vitreous detachment), right eye, Subclinical hypothyroidism (), and Vitamin D deficiency.  She does not have any pertinent problems on file.  She  has a past surgical history that includes Colonoscopy (2015);  "Endometrial biopsy (09/05/2012); Laparoscopic cholecystectomy (2008); and [place] tooth extraction (1977).  Her family history includes Brain cancer in her mother; Dementia in her mother; Diabetes in her father; Heart attack in her father; Hypertension in her father; Muscular dystrophy in her cousin; Stroke in her mother.  She  reports that she has never smoked. She has never used smokeless tobacco. She reports that she does not drink alcohol or use drugs.  Current Outpatient Medications   Medication Sig Dispense Refill   • Multiple Vitamins-Minerals (PRESERVISION AREDS) capsule Take 1 capsule by mouth Daily.     • Probiotic Product (ALIGN) 4 MG capsule Take 1 capsule by mouth Daily.     • Calcium Carbonate (CALTRATE 600 PO) Take  by mouth Daily.     • Cholecalciferol (VITAMIN D3) 125 MCG (5000 UT) capsule capsule Take 1 capsule by mouth Daily.     • Cyanocobalamin (VITAMIN B 12) 250 MCG lozenge Take  by mouth Daily.     • pravastatin (PRAVACHOL) 20 MG tablet Take 20 mg by mouth daily.       No current facility-administered medications for this visit.      She is allergic to levaquin [levofloxacin]..    Review of Systems  A review of systems was taken.  She denies cough, fever, shortness of breath, and loss of her sense of taste or smell  Constitutional: negative for fever, chills, activity change, appetite change, fatigue and unexpected weight change.  Respiratory: negative  Cardiovascular: negative  Gastrointestinal: positive for constipation  Genitourinary:negative  Musculoskeletal:negative  Behavioral/Psych: negative       /78   Temp 98.6 °F (37 °C)   Ht 157.5 cm (62\")   Wt 82.4 kg (181 lb 9.6 oz)   BMI 33.22 kg/m²     Physical Exam    General:  alert; cooperative; well developed; well nourished  obese - Body mass index is 33.22 kg/m².   Skin:  No suspicious lesions seen   Thyroid: normal to inspection and palpation   Lungs:  clear to auscultation bilaterally   Heart:  regular rate and rhythm, S1, S2 " normal, no murmur, click, rub or gallop   Breasts:  Examined in supine position  Symmetric without masses or skin dimpling  Nipples normal without inversion, lesions or discharge  There are no palpable axillary nodes   Abdomen: soft, non-tender; no masses  no umbilical or inguinal hernias are present  no hepato-splenomegaly   Pelvis: Clinical staff was present for exam  External genitalia:  normal appearance of the external genitalia including Bartholin's and [place]'s glands.  Vaginal:  normal pink mucosa without prolapse or lesions.  Cervix:  normal appearance.  Uterus:  normal size, shape and consistency. anteverted;  Adnexa:  non palpable bilaterally.  Rectal:  anus visually normal appearing. recto-vaginal exam unremarkable and confirms findings;     Lab Review   No data reviewed    Imaging  Mammogram results         ASSESSMENT  Problems Addressed this Visit        Genitourinary    Menopause - Primary    Vaginal atrophy      Other Visit Diagnoses     Encounter for gynecological examination without abnormal finding                  Substance History:   reports that she has never smoked. She has never used smokeless tobacco.   reports that she does not drink alcohol.   reports that she does not use drugs.    Substance use counseling is not indicated based on patient history.      PLAN    · Medications prescribed this encounter:  No orders of the defined types were placed in this encounter.  · Monthly self breast assessment and annual breast imaging  · Calcium, 600 mg/ Vit. D, 400 IU daily; regular weight-bearing exercise  · Follow up: 12 month(s)  *Please note that portions of this documentation may have been completed with a voice recognition program.  Efforts were made to edit this dictation, but occasional words may have been mistranscribed.       This note was electronically signed.    FOUZIA Munoz MD  August 25, 2020  16:23

## 2021-06-07 ENCOUNTER — TRANSCRIBE ORDERS (OUTPATIENT)
Dept: ADMINISTRATIVE | Facility: HOSPITAL | Age: 61
End: 2021-06-07

## 2021-06-07 DIAGNOSIS — Z12.31 VISIT FOR SCREENING MAMMOGRAM: Primary | ICD-10-CM

## 2021-06-08 ENCOUNTER — HOSPITAL ENCOUNTER (OUTPATIENT)
Dept: MAMMOGRAPHY | Facility: HOSPITAL | Age: 61
Discharge: HOME | End: 2021-06-08
Admitting: OBSTETRICS & GYNECOLOGY

## 2021-06-08 DIAGNOSIS — Z12.31 VISIT FOR SCREENING MAMMOGRAM: ICD-10-CM

## 2021-06-08 PROCEDURE — 77063 BREAST TOMOSYNTHESIS BI: CPT | Performed by: RADIOLOGY

## 2021-06-08 PROCEDURE — 77067 SCR MAMMO BI INCL CAD: CPT

## 2021-06-08 PROCEDURE — 77067 SCR MAMMO BI INCL CAD: CPT | Performed by: RADIOLOGY

## 2021-06-08 PROCEDURE — 77063 BREAST TOMOSYNTHESIS BI: CPT

## 2021-08-26 ENCOUNTER — OFFICE VISIT (OUTPATIENT)
Dept: OBSTETRICS AND GYNECOLOGY | Facility: CLINIC | Age: 61
End: 2021-08-26

## 2021-08-26 VITALS
DIASTOLIC BLOOD PRESSURE: 86 MMHG | WEIGHT: 179.8 LBS | BODY MASS INDEX: 33.09 KG/M2 | SYSTOLIC BLOOD PRESSURE: 130 MMHG | HEIGHT: 62 IN

## 2021-08-26 DIAGNOSIS — Z01.419 ENCOUNTER FOR GYNECOLOGICAL EXAMINATION WITHOUT ABNORMAL FINDING: Primary | ICD-10-CM

## 2021-08-26 DIAGNOSIS — Z78.0 MENOPAUSE: ICD-10-CM

## 2021-08-26 PROCEDURE — 99396 PREV VISIT EST AGE 40-64: CPT | Performed by: OBSTETRICS & GYNECOLOGY

## 2021-08-26 NOTE — PROGRESS NOTES
Chief Complaint   Patient presents with   • Annual Exam       Milana Elias is a 60 y.o. year old  presenting to be seen for her annual exam.  This patient is menopausal and does not use estrogen/progestin replacement therapy.  She has had a laparoscopic cholecystectomy.  She has had no gynecologic surgery.  She denies urinary symptoms.  She has chronic constipation.  She has had Covid-19 immunization (J&J)    SCREENING TESTS    Year 2012   Age                         PAP        Neg.                 HPV high risk                         Mammogram         benign benign               ARY score                         Breast MRI                         Lipids                         Vitamin D                         Colonoscopy                         DEXA  Frax (hip/any)                         Ovarian Screen                             She exercises regularly: yes.  She wears her seat belt: yes.  She has concerns about domestic violence: no.  She has noticed changes in height: no    GYN screening history:  · Last pap: was done on approximately 2019 and the result was: normal PAP.  · Last mammogram: was done on approximately 2021 and the result was: Birads I (Normal)..    No Additional Complaints Reported    The following portions of the patient's history were reviewed and updated as appropriate:vital signs and   She  has a past medical history of Hyperlipidemia (), IBS (irritable bowel syndrome), PVD (posterior vitreous detachment), right eye, Subclinical hypothyroidism (), and Vitamin D deficiency.  She does not have any pertinent problems on file.  She  has a past surgical history that includes Colonoscopy (2015); Endometrial biopsy (2012); Laparoscopic cholecystectomy (); and [place] tooth extraction ().  Her family history includes Brain cancer in her mother;  "Dementia in her mother; Diabetes in her father; Heart attack in her father; Hypertension in her father; Muscular dystrophy in her cousin; Stroke in her mother.  She  reports that she has never smoked. She has never used smokeless tobacco. She reports that she does not drink alcohol and does not use drugs.  Current Outpatient Medications   Medication Sig Dispense Refill   • Calcium Carbonate (CALTRATE 600 PO) Take  by mouth Daily.     • Cholecalciferol (VITAMIN D3) 125 MCG (5000 UT) capsule capsule Take 1 capsule by mouth Daily.     • Cyanocobalamin (VITAMIN B 12) 250 MCG lozenge Take  by mouth Daily.     • Multiple Vitamins-Minerals (PRESERVISION AREDS) capsule Take 1 capsule by mouth Daily.     • pravastatin (PRAVACHOL) 20 MG tablet Take 20 mg by mouth daily.     • Probiotic Product (ALIGN) 4 MG capsule Take 1 capsule by mouth Daily.       No current facility-administered medications for this visit.     She is allergic to levaquin [levofloxacin]..    Review of Systems  A review of systems was taken.  Constitutional: negative for fever, chills, activity change, appetite change, fatigue and unexpected weight change.  Respiratory: negative  Cardiovascular: negative  Gastrointestinal: negative  Genitourinary:negative  Musculoskeletal:negative  Behavioral/Psych: negative     Counseling/Anticipatory Guidance Discussed: nutrition, physical activity, healthy weight, immunizations, screenings and self-breast exam      /86   Ht 157.5 cm (62\")   Wt 81.6 kg (179 lb 12.8 oz)   Breastfeeding No   BMI 32.89 kg/m²     BMI reviewed     MEDICALLY INDICATED   Physical Exam    Neuro: alert and oriented to person, place and time    General:  alert; cooperative; well developed; well nourished  obese - Body mass index is 32.89 kg/m².   Skin:  No suspicious lesions seen   Thyroid: normal to inspection and palpation   Lungs:  breathing is unlabored  clear to auscultation bilaterally   Heart:  regular rate and rhythm, S1, S2 " normal, no murmur, click, rub or gallop  normal apical impulse   Breasts:  Examined in supine position  Symmetric without masses or skin dimpling  Nipples normal without inversion, lesions or discharge  There are no palpable axillary nodes   Abdomen: soft, non-tender; no masses  no umbilical or inguinal hernias are present  no hepato-splenomegaly   Pelvis: Clinical staff was present for exam  External genitalia:  normal appearance of the external genitalia including Bartholin's and [place]'s glands.  Vaginal:  atrophic mucosal changes are present;  Cervix:  normal appearance.  Uterus:  normal size, shape and consistency. anteverted;  Adnexa:  non palpable bilaterally.  Rectal:  anus visually normal appearing. recto-vaginal exam unremarkable and confirms findings;     Lab Review   No data reviewed    Imaging  Mammogram results              ASSESSMENT  Problems Addressed this Visit        Genitourinary and Reproductive     Menopause      Other Visit Diagnoses     Encounter for gynecological examination without abnormal finding    -  Primary      Diagnoses       Codes Comments    Encounter for gynecological examination without abnormal finding    -  Primary ICD-10-CM: Z01.419  ICD-9-CM: V72.31     Menopause     ICD-10-CM: Z78.0  ICD-9-CM: 627.2               Substance History:   reports that she has never smoked. She has never used smokeless tobacco.   reports no history of alcohol use.   reports no history of drug use.    Substance use counseling is not indicated based on patient history.      PLAN    · Medications prescribed this encounter:  No orders of the defined types were placed in this encounter.  · Monthly self breast assessment and annual breast imaging  · Calcium, 600 mg/ Vit. D, 400 IU daily; regular weight-bearing exercise  · Follow up: 12 month(s)  *Please note that portions of this documentation may have been completed with a voice recognition program.  Efforts were made to edit this dictation, but  occasional words may have been mistranscribed.       This note was electronically signed.    FOUZIA Munoz MD  August 26, 2021  16:05 EDT

## 2022-06-06 ENCOUNTER — TRANSCRIBE ORDERS (OUTPATIENT)
Dept: ADMINISTRATIVE | Facility: HOSPITAL | Age: 62
End: 2022-06-06

## 2022-06-06 DIAGNOSIS — Z12.31 VISIT FOR SCREENING MAMMOGRAM: Primary | ICD-10-CM

## 2022-06-29 ENCOUNTER — HOSPITAL ENCOUNTER (OUTPATIENT)
Dept: MAMMOGRAPHY | Facility: HOSPITAL | Age: 62
Discharge: HOME | End: 2022-06-29
Admitting: FAMILY MEDICINE

## 2022-06-29 DIAGNOSIS — Z12.31 VISIT FOR SCREENING MAMMOGRAM: ICD-10-CM

## 2022-06-29 PROCEDURE — 77063 BREAST TOMOSYNTHESIS BI: CPT | Performed by: RADIOLOGY

## 2022-06-29 PROCEDURE — 77067 SCR MAMMO BI INCL CAD: CPT

## 2022-06-29 PROCEDURE — 77067 SCR MAMMO BI INCL CAD: CPT | Performed by: RADIOLOGY

## 2022-06-29 PROCEDURE — 77063 BREAST TOMOSYNTHESIS BI: CPT

## 2022-08-30 ENCOUNTER — OFFICE VISIT (OUTPATIENT)
Dept: OBSTETRICS AND GYNECOLOGY | Facility: CLINIC | Age: 62
End: 2022-08-30

## 2022-08-30 VITALS
HEIGHT: 62 IN | SYSTOLIC BLOOD PRESSURE: 120 MMHG | WEIGHT: 178.2 LBS | DIASTOLIC BLOOD PRESSURE: 78 MMHG | BODY MASS INDEX: 32.79 KG/M2

## 2022-08-30 DIAGNOSIS — N60.12 FIBROCYSTIC DISEASE OF LEFT BREAST: ICD-10-CM

## 2022-08-30 DIAGNOSIS — Z01.419 PAP TEST, AS PART OF ROUTINE GYNECOLOGICAL EXAMINATION: ICD-10-CM

## 2022-08-30 DIAGNOSIS — Z01.411 ENCOUNTER FOR GYNECOLOGICAL EXAMINATION WITH ABNORMAL FINDING: Primary | ICD-10-CM

## 2022-08-30 PROCEDURE — 99396 PREV VISIT EST AGE 40-64: CPT | Performed by: NURSE PRACTITIONER

## 2022-08-30 RX ORDER — CLOBETASOL PROPIONATE 0.5 MG/G
CREAM TOPICAL
COMMUNITY
Start: 2022-08-15 | End: 2022-12-13

## 2022-08-30 RX ORDER — PRAVASTATIN SODIUM 40 MG
1 TABLET ORAL DAILY
COMMUNITY
Start: 2022-08-03

## 2022-08-30 NOTE — PROGRESS NOTES
"Chief Complaint  Milana Elias is a 61 y.o.  female presenting for Annual Exam and Breast Problem (Additional views required on mammogram.  Patient scheduled 2022.)    History of Present Illness  Milana is a very pleasant 62yo woman, , here for annual gyn exam.  We reviewed her recent mammogram, recommendations for additional views (appt on ).  We also reviewed her current health hx and current conditions.  Preventive health procedures are up to date.    The following portions of the patient's history were reviewed and updated as appropriate: allergies, current medications, past family history, past medical history, past social history, past surgical history and problem list.    Allergies   Allergen Reactions   • Levaquin [Levofloxacin] Other (See Comments)     Dry heaves, Tachycardia, Fever         Current Outpatient Medications:   •  Calcium Carbonate (CALTRATE 600 PO), Take  by mouth Daily., Disp: , Rfl:   •  Cholecalciferol (VITAMIN D3) 125 MCG (5000 UT) capsule capsule, Take 1 capsule by mouth Daily., Disp: , Rfl:   •  clobetasol (TEMOVATE) 0.05 % cream, , Disp: , Rfl:   •  Cyanocobalamin (VITAMIN B 12) 250 MCG lozenge, Take  by mouth Daily., Disp: , Rfl:   •  Multiple Vitamins-Minerals (PRESERVISION AREDS) capsule, Take 1 capsule by mouth Daily., Disp: , Rfl:   •  pravastatin (PRAVACHOL) 40 MG tablet, Take 1 tablet by mouth Daily., Disp: , Rfl:     Past Medical History:   Diagnosis Date   • Hyperlipidemia    • IBS (irritable bowel syndrome)    • PVD (posterior vitreous detachment), right eye    • Subclinical hypothyroidism     Resolved   • Vitamin D deficiency         Past Surgical History:   Procedure Laterality Date   • COLONOSCOPY  2015   • ENDOMETRIAL BIOPSY  2012   • LAPAROSCOPIC CHOLECYSTECTOMY     • WISDOM TOOTH EXTRACTION         Objective  /78   Ht 157.5 cm (62\")   Wt 80.8 kg (178 lb 3.2 oz)   Breastfeeding No   BMI 32.59 kg/m²     Physical " Exam  Exam conducted with a chaperone present.   Constitutional:       Appearance: Normal appearance.   HENT:      Head: Normocephalic.   Neck:      Thyroid: No thyroid mass or thyromegaly.   Cardiovascular:      Rate and Rhythm: Normal rate and regular rhythm.      Heart sounds: Normal heart sounds.   Pulmonary:      Effort: Pulmonary effort is normal.      Breath sounds: Normal breath sounds.   Chest:   Breasts:      Right: No inverted nipple, mass, nipple discharge, axillary adenopathy or supraclavicular adenopathy.      Left: No inverted nipple, mass, nipple discharge, axillary adenopathy or supraclavicular adenopathy.            Comments: 4 x 2cm dense area in the left breast (extends from 11:00 - 12:00, with the lowest level of it being ~4cm from nipple.  No nipple discharge.  No lymphadenopathy.  Abdominal:      Palpations: Abdomen is soft. There is no mass.      Tenderness: There is no abdominal tenderness.   Genitourinary:     General: Normal vulva.      Labia:         Right: No lesion.         Left: No lesion.       Vagina: Normal. No erythema.      Cervix: No discharge, lesion or erythema.      Uterus: Not enlarged and not tender.       Adnexa:         Right: No mass or tenderness.          Left: No mass or tenderness.        Comments: Anus appears wnl.  No rectal exam performed.  Lymphadenopathy:      Upper Body:      Right upper body: No supraclavicular or axillary adenopathy.      Left upper body: No supraclavicular or axillary adenopathy.   Neurological:      Mental Status: She is alert.         Assessment/Plan   Diagnoses and all orders for this visit:    1. Encounter for gynecological examination with abnormal finding (Primary)    2. Pap test, as part of routine gynecological examination  -     LIQUID-BASED PAP SMEAR, [facility] ([facility])    3. Fibrocystic disease of left breast    I sent my breast concerns with pt (and scanned in for the radiologist) when she has the additional views in Sept.       Procedures    40 to 64: Counseling/Anticipatory Guidance Discussed: nutrition, physical activity, screenings and self-breast exam    Return in about 3 months (around 11/30/2022) for Recheck (clinical breast exam).    Amie Padilla, APRAVANI  08/30/2022

## 2022-08-31 LAB — REF LAB TEST METHOD: NORMAL

## 2022-09-20 ENCOUNTER — HOSPITAL ENCOUNTER (OUTPATIENT)
Dept: ULTRASOUND IMAGING | Facility: HOSPITAL | Age: 62
Discharge: HOME | End: 2022-09-20

## 2022-09-20 ENCOUNTER — HOSPITAL ENCOUNTER (OUTPATIENT)
Dept: MAMMOGRAPHY | Facility: HOSPITAL | Age: 62
Discharge: HOME | End: 2022-09-20

## 2022-09-20 DIAGNOSIS — R92.8 ABNORMAL MAMMOGRAM: ICD-10-CM

## 2022-09-20 PROCEDURE — 76642 ULTRASOUND BREAST LIMITED: CPT

## 2022-09-20 PROCEDURE — 77061 BREAST TOMOSYNTHESIS UNI: CPT | Performed by: RADIOLOGY

## 2022-09-20 PROCEDURE — G0279 TOMOSYNTHESIS, MAMMO: HCPCS

## 2022-09-20 PROCEDURE — 77065 DX MAMMO INCL CAD UNI: CPT | Performed by: RADIOLOGY

## 2022-09-20 PROCEDURE — 76642 ULTRASOUND BREAST LIMITED: CPT | Performed by: RADIOLOGY

## 2022-09-20 PROCEDURE — 77065 DX MAMMO INCL CAD UNI: CPT

## 2022-12-13 ENCOUNTER — OFFICE VISIT (OUTPATIENT)
Dept: OBSTETRICS AND GYNECOLOGY | Facility: CLINIC | Age: 62
End: 2022-12-13

## 2022-12-13 VITALS
WEIGHT: 179 LBS | HEIGHT: 62 IN | SYSTOLIC BLOOD PRESSURE: 122 MMHG | DIASTOLIC BLOOD PRESSURE: 80 MMHG | BODY MASS INDEX: 32.94 KG/M2

## 2022-12-13 DIAGNOSIS — N60.19 FIBROCYSTIC BREAST DISEASE (FCBD), UNSPECIFIED LATERALITY: Primary | ICD-10-CM

## 2022-12-13 PROCEDURE — 99213 OFFICE O/P EST LOW 20 MIN: CPT | Performed by: NURSE PRACTITIONER

## 2022-12-13 NOTE — PROGRESS NOTES
"Chief Complaint  Milana Elias is a 62 y.o.  female presenting for Breast Problem (3 month breast check.  S/P imaging/ultrasound.)    History of Present Illness  Milana is a very pleasant 63yo woman, , here for 3 month follow-up of fibrocystic breast problem.  She had an area of concern in the left breast, UIQ.  She now feels the area is less prominent, and nontender.  The mammogram and ultrasound felt the area of concern revealed only \"fibroglandular\" tissue (BiRads 1, negative).  She is faithful about doing SBE.    The following portions of the patient's history were reviewed and updated as appropriate: allergies, current medications, past family history, past medical history, past social history, past surgical history and problem list.    Allergies   Allergen Reactions   • Levaquin [Levofloxacin] Other (See Comments)     Dry heaves, Tachycardia, Fever         Current Outpatient Medications:   •  Calcium Carbonate (CALTRATE 600 PO), Take  by mouth Daily., Disp: , Rfl:   •  Cholecalciferol (VITAMIN D3) 125 MCG (5000 UT) capsule capsule, Take 1 capsule by mouth Daily., Disp: , Rfl:   •  Cyanocobalamin (VITAMIN B 12) 250 MCG lozenge, Take  by mouth Daily., Disp: , Rfl:   •  Multiple Vitamins-Minerals (PRESERVISION AREDS) capsule, Take 1 capsule by mouth Daily., Disp: , Rfl:   •  pravastatin (PRAVACHOL) 40 MG tablet, Take 1 tablet by mouth Daily., Disp: , Rfl:     Past Medical History:   Diagnosis Date   • Hyperlipidemia    • IBS (irritable bowel syndrome)    • PVD (posterior vitreous detachment), right eye    • Subclinical hypothyroidism     Resolved   • Vitamin D deficiency         Past Surgical History:   Procedure Laterality Date   • COLONOSCOPY  2015   • ENDOMETRIAL BIOPSY  2012   • LAPAROSCOPIC CHOLECYSTECTOMY     • WISDOM TOOTH EXTRACTION         Objective  /80   Ht 157.5 cm (62\")   Wt 81.2 kg (179 lb)   Breastfeeding No   BMI 32.74 kg/m²     Physical Exam  Vitals " and nursing note reviewed.   Constitutional:       General: She is not in acute distress.     Appearance: Normal appearance. She is not ill-appearing.   Chest:   Breasts:     Right: No inverted nipple, mass, nipple discharge, skin change or tenderness.      Left: No inverted nipple, mass, nipple discharge, skin change or tenderness.          Comments: There is still some fibrous/ dense areas bilat, but the area of concern in the left breast (UIQ) is smaller than 3 mo ago.  It is ~1cm wide and ~3cm tall.  No nipple discharge.  No lymphadenopathy.  Neurological:      Mental Status: She is alert.         Assessment/Plan   Diagnoses and all orders for this visit:    1. Fibrocystic breast disease (FCBD), unspecified laterality (Primary)    Do Reg monthly SBE.  Mammo anytime after June 20, 2023.  And FU here with annual exam late August 2023.    Call sooner prn any changes or worrisome findings.    Procedures            Return for Annual physical (late Aug).    Amie Padilla, APRN  12/13/2022

## 2023-05-30 ENCOUNTER — TRANSCRIBE ORDERS (OUTPATIENT)
Dept: ADMINISTRATIVE | Facility: HOSPITAL | Age: 63
End: 2023-05-30

## 2023-05-30 DIAGNOSIS — Z12.31 VISIT FOR SCREENING MAMMOGRAM: Primary | ICD-10-CM

## 2023-09-06 ENCOUNTER — OFFICE VISIT (OUTPATIENT)
Dept: OBSTETRICS AND GYNECOLOGY | Facility: CLINIC | Age: 63
End: 2023-09-06
Payer: COMMERCIAL

## 2023-09-06 VITALS
WEIGHT: 176.4 LBS | DIASTOLIC BLOOD PRESSURE: 82 MMHG | BODY MASS INDEX: 32.46 KG/M2 | SYSTOLIC BLOOD PRESSURE: 120 MMHG | HEIGHT: 62 IN

## 2023-09-06 DIAGNOSIS — Z01.411 ENCOUNTER FOR GYNECOLOGICAL EXAMINATION WITH ABNORMAL FINDING: Primary | ICD-10-CM

## 2023-09-06 DIAGNOSIS — N95.2 ATROPHY OF VAGINA: ICD-10-CM

## 2023-09-06 RX ORDER — LORATADINE 10 MG
1 TABLET ORAL DAILY
COMMUNITY
Start: 2023-08-05

## 2023-09-06 RX ORDER — ESTRADIOL 10 UG/1
1 INSERT VAGINAL 2 TIMES WEEKLY
Qty: 24 TABLET | Refills: 3 | Status: SHIPPED | OUTPATIENT
Start: 2023-09-07

## 2023-09-06 NOTE — PROGRESS NOTES
Chief Complaint  Milana Elias is a 63 y.o.  female presenting for Annual Exam    History of Present Illness  Milana is a very pleasant 63-year-old woman,  2 para 2, here for annual GYN exam.  She has a past surgical history of an endometrial biopsy, but no other GYN surgeries.  Review of systems is essentially negative.  Preventive health procedures are up-to-date.    The following portions of the patient's history were reviewed and updated as appropriate: allergies, current medications, past family history, past medical history, past social history, past surgical history, and problem list.    Allergies   Allergen Reactions    Levaquin [Levofloxacin] Other (See Comments)     Dry heaves, Tachycardia, Fever         Current Outpatient Medications:     Wal-itin D 24 Hour  MG per 24 hr tablet, Take 1 tablet by mouth Daily., Disp: , Rfl:     Calcium Carbonate (CALTRATE 600 PO), Take  by mouth Daily., Disp: , Rfl:     Cholecalciferol (VITAMIN D3) 125 MCG (5000 UT) capsule capsule, Take 1 capsule by mouth Daily., Disp: , Rfl:     Cyanocobalamin (VITAMIN B 12) 250 MCG lozenge, Take  by mouth Daily., Disp: , Rfl:     [START ON 2023] estradiol (Vagifem) 10 MCG tablet vaginal tablet, Insert 1 tablet into the vagina 2 (Two) Times a Week., Disp: 24 tablet, Rfl: 3    Multiple Vitamins-Minerals (PRESERVISION AREDS) capsule, Take 1 capsule by mouth Daily., Disp: , Rfl:     pravastatin (PRAVACHOL) 40 MG tablet, Take 1 tablet by mouth Daily., Disp: , Rfl:     Past Medical History:   Diagnosis Date    Hyperlipidemia     IBS (irritable bowel syndrome)     PVD (posterior vitreous detachment), right eye     Subclinical hypothyroidism     Resolved    Vitamin D deficiency         Past Surgical History:   Procedure Laterality Date    COLONOSCOPY  2015    DENTAL PROCEDURE      dental implant    ENDOMETRIAL BIOPSY  2012    LAPAROSCOPIC CHOLECYSTECTOMY  2008    WISDOM TOOTH EXTRACTION    "      Objective  /82   Ht 157.5 cm (62\")   Wt 80 kg (176 lb 6.4 oz)   LMP  (LMP Unknown)   Breastfeeding No   BMI 32.26 kg/m²     Physical Exam  Vitals and nursing note reviewed. Exam conducted with a chaperone present.   Constitutional:       General: She is not in acute distress.     Appearance: Normal appearance. She is not ill-appearing.   HENT:      Head: Normocephalic.   Neck:      Thyroid: No thyroid mass or thyromegaly.   Cardiovascular:      Rate and Rhythm: Normal rate and regular rhythm.      Heart sounds: Normal heart sounds. No murmur heard.  Pulmonary:      Effort: Pulmonary effort is normal. No respiratory distress.      Breath sounds: Normal breath sounds.   Chest:   Breasts:     Right: No inverted nipple, mass or nipple discharge.      Left: No inverted nipple, mass or nipple discharge.   Abdominal:      Palpations: Abdomen is soft. There is no mass.      Tenderness: There is no abdominal tenderness.   Genitourinary:     General: Normal vulva.      Labia:         Right: No rash, tenderness or lesion.         Left: No rash, tenderness or lesion.       Vagina: Normal. Erythema present.      Cervix: No discharge, lesion or erythema.      Uterus: Not enlarged and not tender.       Adnexa:         Right: No mass or tenderness.          Left: No mass or tenderness.        Comments: Marked atrophic erythema.    Anus appears wnl.  No rectal exam performed.  Lymphadenopathy:      Upper Body:      Right upper body: No supraclavicular or axillary adenopathy.      Left upper body: No supraclavicular or axillary adenopathy.   Skin:     General: Skin is warm and dry.   Neurological:      Mental Status: She is alert and oriented to person, place, and time.   Psychiatric:         Mood and Affect: Mood normal.         Behavior: Behavior normal.       Assessment/Plan   Diagnoses and all orders for this visit:    1. Encounter for gynecological examination with abnormal finding (Primary)    2. Atrophy of " vagina  -     estradiol (Vagifem) 10 MCG tablet vaginal tablet; Insert 1 tablet into the vagina 2 (Two) Times a Week.  Dispense: 24 tablet; Refill: 3        Procedures    40 to 64: Counseling/Anticipatory Guidance Discussed: screenings, self-breast exam, and atrophy & safety of the Tx.        Return in about 3 months (around 12/6/2023) for Recheck (FU atrophy).    Amie Padilla, CELSA  09/06/2023

## 2023-12-18 ENCOUNTER — OFFICE VISIT (OUTPATIENT)
Dept: OBSTETRICS AND GYNECOLOGY | Facility: CLINIC | Age: 63
End: 2023-12-18
Payer: COMMERCIAL

## 2023-12-18 VITALS
WEIGHT: 173 LBS | HEIGHT: 62 IN | SYSTOLIC BLOOD PRESSURE: 120 MMHG | DIASTOLIC BLOOD PRESSURE: 72 MMHG | BODY MASS INDEX: 31.83 KG/M2

## 2023-12-18 DIAGNOSIS — N95.2 ATROPHY OF VAGINA: Primary | ICD-10-CM

## 2023-12-18 DIAGNOSIS — Z09 FOLLOW-UP EXAM: ICD-10-CM

## 2023-12-18 RX ORDER — TRIAMCINOLONE ACETONIDE 1 MG/G
1 CREAM TOPICAL 2 TIMES DAILY
COMMUNITY
Start: 2023-12-06

## 2023-12-18 RX ORDER — LANSOPRAZOLE 30 MG/1
30 CAPSULE, DELAYED RELEASE ORAL DAILY
COMMUNITY
Start: 2023-12-06

## 2023-12-18 RX ORDER — CLOBETASOL PROPIONATE 0.5 MG/G
1 CREAM TOPICAL 2 TIMES DAILY
COMMUNITY
Start: 2023-12-06

## 2023-12-18 NOTE — PROGRESS NOTES
Chief Complaint  Milana Elias is a 63 y.o.  female presenting for Follow-up (3 month follow-up vaginal atrophy.)    History of Present Illness  Milana is a very pleasant 64yo woman, , here for 3 mo FU of vaginal atrophy.  She really was not symptomatic, so she really doesn't know if anything is better now.  She has been faithful re: using the med twice/ weekly.  No vaginitis sx or dysuria.  No itching, burning, etc.    The following portions of the patient's history were reviewed and updated as appropriate: allergies, current medications, past family history, past medical history, past social history, past surgical history, and problem list.    Allergies   Allergen Reactions    Levaquin [Levofloxacin] Other (See Comments)     Dry heaves, Tachycardia, Fever         Current Outpatient Medications:     clobetasol (TEMOVATE) 0.05 % cream, Apply 1 application  topically to the appropriate area as directed 2 (Two) Times a Day., Disp: , Rfl:     lansoprazole (PREVACID) 30 MG capsule, Take 1 capsule by mouth Daily., Disp: , Rfl:     triamcinolone (KENALOG) 0.1 % cream, Apply 1 application  topically to the appropriate area as directed 2 (Two) Times a Day., Disp: , Rfl:     Calcium Carbonate (CALTRATE 600 PO), Take  by mouth Daily., Disp: , Rfl:     Cholecalciferol (VITAMIN D3) 125 MCG (5000 UT) capsule capsule, Take 1 capsule by mouth Daily., Disp: , Rfl:     Cyanocobalamin (VITAMIN B 12) 250 MCG lozenge, Take  by mouth Daily., Disp: , Rfl:     estradiol (Vagifem) 10 MCG tablet vaginal tablet, Insert 1 tablet into the vagina 2 (Two) Times a Week., Disp: 24 tablet, Rfl: 3    Multiple Vitamins-Minerals (PRESERVISION AREDS) capsule, Take 1 capsule by mouth Daily., Disp: , Rfl:     pravastatin (PRAVACHOL) 40 MG tablet, Take 1 tablet by mouth Daily., Disp: , Rfl:     Wal-itin D 24 Hour  MG per 24 hr tablet, Take 1 tablet by mouth Daily., Disp: , Rfl:     Past Medical History:   Diagnosis Date    Hyperlipidemia  "2012    IBS (irritable bowel syndrome)     Psoriasis     PVD (posterior vitreous detachment), right eye     Subclinical hypothyroidism 2012    Resolved    Vitamin D deficiency         Past Surgical History:   Procedure Laterality Date    COLONOSCOPY  04/27/2015    DENTAL PROCEDURE      dental implant    ENDOMETRIAL BIOPSY  09/05/2012    LAPAROSCOPIC CHOLECYSTECTOMY  2008    WISDOM TOOTH EXTRACTION  1977       Objective  /72   Ht 157.5 cm (62\")   Wt 78.5 kg (173 lb)   LMP  (LMP Unknown)   Breastfeeding No   BMI 31.64 kg/m²     Physical Exam  Exam conducted with a chaperone present.   Constitutional:       Appearance: Normal appearance.   Abdominal:      General: Bowel sounds are normal.      Palpations: Abdomen is soft. There is no mass.      Tenderness: There is no abdominal tenderness.   Genitourinary:     General: Normal vulva.      Labia:         Right: No rash, tenderness or lesion.         Left: No rash, tenderness or lesion.       Vagina: Normal. No vaginal discharge or erythema.      Cervix: No cervical motion tenderness, discharge, lesion or erythema.      Uterus: Normal. Not enlarged and not tender.       Adnexa: Right adnexa normal and left adnexa normal.        Right: No mass or tenderness.          Left: No mass or tenderness.        Rectum: Normal.      Comments: Anus appears wnl.  (No rectal exam performed.)        Assessment/Plan   Diagnoses and all orders for this visit:    1. Atrophy of vagina (Primary)    2. Follow-up exam    Atrophy is much better;  now mostly pink mucosa/ intact.  Continue the med twice/ weekly.    Procedures    40 to 64: Counseling/Anticipatory Guidance Discussed: importance of med for atrophy / preventing infections    Return for Annual physical.    Amie Padilla, APRN  12/18/2023  "

## 2024-07-03 ENCOUNTER — TRANSCRIBE ORDERS (OUTPATIENT)
Dept: ADMINISTRATIVE | Facility: HOSPITAL | Age: 64
End: 2024-07-03
Payer: COMMERCIAL

## 2024-07-03 DIAGNOSIS — Z12.31 SCREENING MAMMOGRAM FOR BREAST CANCER: Primary | ICD-10-CM

## 2024-08-06 ENCOUNTER — HOSPITAL ENCOUNTER (OUTPATIENT)
Dept: MAMMOGRAPHY | Facility: HOSPITAL | Age: 64
Discharge: HOME | End: 2024-08-06
Admitting: FAMILY MEDICINE
Payer: COMMERCIAL

## 2024-08-06 DIAGNOSIS — Z12.31 SCREENING MAMMOGRAM FOR BREAST CANCER: ICD-10-CM

## 2024-08-06 PROCEDURE — 77067 SCR MAMMO BI INCL CAD: CPT

## 2024-08-06 PROCEDURE — 77063 BREAST TOMOSYNTHESIS BI: CPT

## 2024-09-10 ENCOUNTER — OFFICE VISIT (OUTPATIENT)
Dept: OBSTETRICS AND GYNECOLOGY | Facility: CLINIC | Age: 64
End: 2024-09-10
Payer: COMMERCIAL

## 2024-09-10 VITALS
DIASTOLIC BLOOD PRESSURE: 76 MMHG | BODY MASS INDEX: 32.54 KG/M2 | WEIGHT: 176.8 LBS | SYSTOLIC BLOOD PRESSURE: 124 MMHG | HEIGHT: 62 IN

## 2024-09-10 DIAGNOSIS — N95.2 ATROPHY OF VAGINA: ICD-10-CM

## 2024-09-10 DIAGNOSIS — Z01.419 ENCOUNTER FOR GYNECOLOGICAL EXAMINATION WITHOUT ABNORMAL FINDING: Primary | ICD-10-CM

## 2024-09-10 PROCEDURE — 99396 PREV VISIT EST AGE 40-64: CPT | Performed by: NURSE PRACTITIONER

## 2024-09-10 RX ORDER — ALUMINUM ZIRCONIUM OCTACHLOROHYDREX GLY 16 G/100G
1 GEL TOPICAL DAILY
COMMUNITY

## 2024-09-10 NOTE — PROGRESS NOTES
Chief Complaint  Milana Elias is a 64 y.o.  female presenting for Annual Exam and Med Refill (Patient will contact us when she needs prescription refills. )    History of Present Illness  Milana is a 63yo woman, , here for annual gyn exam.  She had an EMBx in , but no past gynecologic surgeries.  She uses Vagifem for vaginal atrophy.  Medical history is significant for hyperlipidemia (on a statin), IBS, psoriasis, posterior vitreous detachment in right eye, subclinical hypothyroidism (no meds needed at this time), and Vitamin D deficiency.    Last pap smear 22,  negative  Last mammogram 24, negative  Colonoscopy 2/3/20, to repeat in 10 yrs.      The following portions of the patient's history were reviewed and updated as appropriate: allergies, current medications, past family history, past medical history, past social history, past surgical history, and problem list.    Allergies   Allergen Reactions    Levaquin [Levofloxacin] Other (See Comments)     Dry heaves, Tachycardia, Fever         Current Outpatient Medications:     Calcium Carbonate (CALTRATE 600 PO), Take  by mouth Daily., Disp: , Rfl:     Cholecalciferol (VITAMIN D3) 125 MCG (5000 UT) capsule capsule, Take 1 capsule by mouth Daily., Disp: , Rfl:     clobetasol (TEMOVATE) 0.05 % cream, Apply 1 application  topically to the appropriate area as directed 2 (Two) Times a Day., Disp: , Rfl:     cyanocobalamin (VITAMIN B-12) 1000 MCG tablet, Take 1 tablet by mouth Daily., Disp: , Rfl:     estradiol (Vagifem) 10 MCG tablet vaginal tablet, Insert 1 tablet into the vagina 2 (Two) Times a Week., Disp: 24 tablet, Rfl: 3    lansoprazole (PREVACID) 30 MG capsule, Take 1 capsule by mouth Daily., Disp: , Rfl:     Multiple Vitamins-Minerals (PRESERVISION AREDS) capsule, Take 1 capsule by mouth Daily., Disp: , Rfl:     pravastatin (PRAVACHOL) 40 MG tablet, Take 1 tablet by mouth Daily., Disp: , Rfl:     Probiotic Product (Align) 4 MG capsule,  "Take 1 capsule by mouth Daily., Disp: , Rfl:     triamcinolone (KENALOG) 0.1 % cream, Apply 1 application  topically to the appropriate area as directed 2 (Two) Times a Day., Disp: , Rfl:     Wal-itin D 24 Hour  MG per 24 hr tablet, Take 1 tablet by mouth Daily., Disp: , Rfl:     Past Medical History:   Diagnosis Date    Hyperlipidemia 2012    IBS (irritable bowel syndrome)     Psoriasis     PVD (posterior vitreous detachment), right eye     Subclinical hypothyroidism 2012    Resolved    Vitamin D deficiency         Past Surgical History:   Procedure Laterality Date    COLONOSCOPY  04/27/2015    DENTAL PROCEDURE      dental implant    ENDOMETRIAL BIOPSY  09/05/2012    LAPAROSCOPIC CHOLECYSTECTOMY  2008    WISDOM TOOTH EXTRACTION  1977       Objective  /76   Ht 157.5 cm (62\")   Wt 80.2 kg (176 lb 12.8 oz)   LMP  (LMP Unknown)   Breastfeeding No   BMI 32.34 kg/m²     Physical Exam  Vitals and nursing note reviewed. Exam conducted with a chaperone present.   Constitutional:       General: She is not in acute distress.     Appearance: Normal appearance. She is not ill-appearing.   HENT:      Head: Normocephalic.   Neck:      Thyroid: No thyroid mass or thyromegaly.   Cardiovascular:      Rate and Rhythm: Normal rate and regular rhythm.      Heart sounds: Normal heart sounds. No murmur heard.  Pulmonary:      Effort: Pulmonary effort is normal. No respiratory distress.      Breath sounds: Normal breath sounds.   Chest:   Breasts:     Right: No inverted nipple, mass or nipple discharge.      Left: No inverted nipple, mass or nipple discharge.   Abdominal:      Palpations: Abdomen is soft. There is no mass.      Tenderness: There is no abdominal tenderness.   Genitourinary:     General: Normal vulva.      Labia:         Right: No rash, tenderness or lesion.         Left: No rash, tenderness or lesion.       Vagina: Normal. No erythema.      Cervix: No discharge, lesion or erythema.      Uterus: Not " enlarged and not tender.       Adnexa:         Right: No mass or tenderness.          Left: No mass or tenderness.        Comments: Anus appears wnl.  No rectal exam performed.  Lymphadenopathy:      Upper Body:      Right upper body: No supraclavicular or axillary adenopathy.      Left upper body: No supraclavicular or axillary adenopathy.   Skin:     General: Skin is warm and dry.   Neurological:      Mental Status: She is alert and oriented to person, place, and time.   Psychiatric:         Mood and Affect: Mood normal.         Behavior: Behavior normal.         Assessment/Plan   Diagnoses and all orders for this visit:    1. Encounter for gynecological examination without abnormal finding (Primary)    2. Atrophy of vagina    Continue Vagifem twice/ weekly.  (She doesn't want a Rx sent in yet;  will call when she needs more.)    Procedures    40 to 64: Counseling/Anticipatory Guidance Discussed: nutrition, physical activity, screenings, and self-breast exam    Return in about 1 year (around 9/10/2025) for Annual physical.    Amie Padilla, APRN  09/10/2024

## 2024-10-22 DIAGNOSIS — N95.2 ATROPHY OF VAGINA: ICD-10-CM

## 2024-10-22 NOTE — TELEPHONE ENCOUNTER
Caller:   SHERMAN DENTON      Relationship: SELF    Best call back number: [phone]    Requested Prescriptions: estradiol (Vagifem) 10 MCG tablet vaginal tablet   Requested Prescriptions      No prescriptions requested or ordered in this encounter        Pharmacy where request should be sent:    [id number] [place], [place] - [facility] [address] AT [facility] & [address]       Last office visit with prescribing clinician: 9/10/2024   Last telemedicine visit with prescribing clinician: Visit date not found   Next office visit with prescribing clinician: Visit date not found     Additional details provided by patient: PT HAS ONE BOX LEFT      Catherine Pierre Rep   10/22/24 15:59 EDT

## 2024-10-23 RX ORDER — ESTRADIOL 10 UG/1
1 INSERT VAGINAL 2 TIMES WEEKLY
Qty: 24 TABLET | Refills: 3 | Status: SHIPPED | OUTPATIENT
Start: 2024-10-24

## 2025-06-24 ENCOUNTER — TRANSCRIBE ORDERS (OUTPATIENT)
Dept: ADMINISTRATIVE | Facility: HOSPITAL | Age: 65
End: 2025-06-24
Payer: COMMERCIAL

## 2025-06-24 DIAGNOSIS — Z12.31 SCREENING MAMMOGRAM FOR BREAST CANCER: Primary | ICD-10-CM

## 2025-07-24 LAB
NCCN CRITERIA FLAG: NORMAL
TYRER CUZICK SCORE: 6.2

## 2025-08-08 ENCOUNTER — HOSPITAL ENCOUNTER (OUTPATIENT)
Dept: MAMMOGRAPHY | Facility: HOSPITAL | Age: 65
Discharge: HOME | End: 2025-08-08
Admitting: FAMILY MEDICINE
Payer: COMMERCIAL

## 2025-08-08 DIAGNOSIS — Z12.31 SCREENING MAMMOGRAM FOR BREAST CANCER: ICD-10-CM

## 2025-08-08 PROCEDURE — 77067 SCR MAMMO BI INCL CAD: CPT

## 2025-08-08 PROCEDURE — 77063 BREAST TOMOSYNTHESIS BI: CPT
